# Patient Record
Sex: FEMALE | Race: WHITE | Employment: FULL TIME | URBAN - NONMETROPOLITAN AREA
[De-identification: names, ages, dates, MRNs, and addresses within clinical notes are randomized per-mention and may not be internally consistent; named-entity substitution may affect disease eponyms.]

---
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
## 2022-10-03 ENCOUNTER — HOSPITAL ENCOUNTER (OUTPATIENT)
Dept: LAB | Age: 22
Discharge: HOME | End: 2022-10-03
Payer: MEDICAID

## 2022-10-03 ENCOUNTER — OFFICE VISIT (OUTPATIENT)
Dept: FAMILY MEDICINE CLINIC | Age: 22
End: 2022-10-03
Payer: MEDICAID

## 2022-10-03 VITALS
WEIGHT: 234.8 LBS | TEMPERATURE: 97.3 F | BODY MASS INDEX: 40.08 KG/M2 | HEIGHT: 64 IN | RESPIRATION RATE: 20 BRPM | DIASTOLIC BLOOD PRESSURE: 86 MMHG | HEART RATE: 88 BPM | OXYGEN SATURATION: 100 % | SYSTOLIC BLOOD PRESSURE: 133 MMHG

## 2022-10-03 DIAGNOSIS — F33.1 MODERATE EPISODE OF RECURRENT MAJOR DEPRESSIVE DISORDER (HCC): ICD-10-CM

## 2022-10-03 DIAGNOSIS — R06.83 SNORING: Primary | ICD-10-CM

## 2022-10-03 DIAGNOSIS — F17.200 VAPING NICOTINE DEPENDENCE, NON-TOBACCO PRODUCT: ICD-10-CM

## 2022-10-03 DIAGNOSIS — Z00.00 HEALTHCARE MAINTENANCE: ICD-10-CM

## 2022-10-03 DIAGNOSIS — E66.01 MORBID OBESITY WITH BMI OF 40.0-44.9, ADULT (HCC): ICD-10-CM

## 2022-10-03 PROBLEM — F41.9 ANXIETY: Status: ACTIVE | Noted: 2022-03-24

## 2022-10-03 PROBLEM — J35.8 TONSIL STONE: Status: RESOLVED | Noted: 2018-12-19 | Resolved: 2022-10-03

## 2022-10-03 PROBLEM — N91.1 AMENORRHEA, SECONDARY: Status: RESOLVED | Noted: 2021-12-30 | Resolved: 2022-10-03

## 2022-10-03 PROBLEM — F41.0 GENERALIZED ANXIETY DISORDER WITH PANIC ATTACKS: Status: ACTIVE | Noted: 2021-08-03

## 2022-10-03 PROBLEM — F41.1 GENERALIZED ANXIETY DISORDER WITH PANIC ATTACKS: Status: ACTIVE | Noted: 2021-08-03

## 2022-10-03 PROBLEM — E66.9 OBESITY (BMI 35.0-39.9 WITHOUT COMORBIDITY): Status: ACTIVE | Noted: 2021-12-02

## 2022-10-03 PROBLEM — J35.01 CHRONIC TONSILLITIS: Status: ACTIVE | Noted: 2018-12-19

## 2022-10-03 PROBLEM — N91.1 AMENORRHEA, SECONDARY: Status: ACTIVE | Noted: 2021-12-30

## 2022-10-03 PROBLEM — N92.6 IRREGULAR MENSTRUAL CYCLE: Status: ACTIVE | Noted: 2022-07-11

## 2022-10-03 PROBLEM — J35.01 CHRONIC TONSILLITIS: Status: RESOLVED | Noted: 2018-12-19 | Resolved: 2022-10-03

## 2022-10-03 PROBLEM — J35.8 TONSIL STONE: Status: ACTIVE | Noted: 2018-12-19

## 2022-10-03 LAB
ALBUMIN SERPL-MCNC: 3.5 G/DL (ref 3.4–5)
ALBUMIN/GLOB SERPL: 0.8 {RATIO} (ref 0.8–1.7)
ALP SERPL-CCNC: 101 U/L (ref 45–117)
ALT SERPL-CCNC: 53 U/L (ref 13–56)
ANION GAP SERPL CALC-SCNC: 11 MMOL/L (ref 3–18)
APPEARANCE UR: CLEAR
AST SERPL W P-5'-P-CCNC: 30 U/L (ref 10–38)
BACTERIA URNS QL MICRO: ABNORMAL /HPF
BILIRUB SERPL-MCNC: 0.2 MG/DL (ref 0.2–1)
BILIRUB UR QL: NEGATIVE
BUN SERPL-MCNC: 3 MG/DL (ref 7–18)
BUN/CREAT SERPL: 5 (ref 12–20)
CA-I BLD-MCNC: 9.3 MG/DL (ref 8.5–10.1)
CHLORIDE SERPL-SCNC: 102 MMOL/L (ref 100–111)
CHOLEST SERPL-MCNC: 184 MG/DL
CO2 SERPL-SCNC: 28 MMOL/L (ref 21–32)
COLOR UR: YELLOW
CREAT SERPL-MCNC: 0.61 MG/DL (ref 0.6–1.3)
EPITH CASTS URNS QL MICRO: ABNORMAL /LPF (ref 0–20)
EST. AVERAGE GLUCOSE BLD GHB EST-MCNC: 111 MG/DL
GLOBULIN SER CALC-MCNC: 4.3 G/DL (ref 2–4)
GLUCOSE SERPL-MCNC: 102 MG/DL (ref 74–99)
GLUCOSE UR STRIP.AUTO-MCNC: NEGATIVE MG/DL
HBA1C MFR BLD: 5.5 % (ref 4.2–5.6)
HDLC SERPL-MCNC: 32 MG/DL (ref 40–60)
HDLC SERPL: 5.8 {RATIO} (ref 0–5)
HGB UR QL STRIP: NEGATIVE
KETONES UR QL STRIP.AUTO: NEGATIVE MG/DL
LDLC SERPL CALC-MCNC: ABNORMAL MG/DL (ref 0–100)
LEUKOCYTE ESTERASE UR QL STRIP.AUTO: ABNORMAL
LIPID PROFILE,FLP: ABNORMAL
NITRITE UR QL STRIP.AUTO: NEGATIVE
PH UR STRIP: 6.5 [PH] (ref 5–8)
POTASSIUM SERPL-SCNC: 3.5 MMOL/L (ref 3.5–5.5)
PROT SERPL-MCNC: 7.8 G/DL (ref 6.4–8.2)
PROT UR STRIP-MCNC: NEGATIVE MG/DL
RBC #/AREA URNS HPF: ABNORMAL /HPF (ref 0–2)
SODIUM SERPL-SCNC: 141 MMOL/L (ref 136–145)
SP GR UR REFRACTOMETRY: <1.005 (ref 1–1.03)
TRIGL SERPL-MCNC: 436 MG/DL (ref ?–150)
UROBILINOGEN UR QL STRIP.AUTO: 0.2 EU/DL (ref 0.2–1)
VLDLC SERPL CALC-MCNC: ABNORMAL MG/DL
WBC URNS QL MICRO: ABNORMAL /HPF (ref 0–4)

## 2022-10-03 PROCEDURE — 83036 HEMOGLOBIN GLYCOSYLATED A1C: CPT

## 2022-10-03 PROCEDURE — 84443 ASSAY THYROID STIM HORMONE: CPT

## 2022-10-03 PROCEDURE — 86803 HEPATITIS C AB TEST: CPT

## 2022-10-03 PROCEDURE — 80061 LIPID PANEL: CPT

## 2022-10-03 PROCEDURE — 36415 COLL VENOUS BLD VENIPUNCTURE: CPT

## 2022-10-03 PROCEDURE — 80053 COMPREHEN METABOLIC PANEL: CPT

## 2022-10-03 PROCEDURE — 81001 URINALYSIS AUTO W/SCOPE: CPT

## 2022-10-03 PROCEDURE — 99204 OFFICE O/P NEW MOD 45 MIN: CPT | Performed by: STUDENT IN AN ORGANIZED HEALTH CARE EDUCATION/TRAINING PROGRAM

## 2022-10-03 RX ORDER — ESCITALOPRAM OXALATE 10 MG/1
10 TABLET ORAL DAILY
Qty: 30 TABLET | Refills: 2 | Status: SHIPPED | OUTPATIENT
Start: 2022-10-03

## 2022-10-03 NOTE — PROGRESS NOTES
NEW PATIENT    History of Present Illness    Chief Complaint   Patient presents with    New Patient     Here to establish care        mEily Ba is a 23 y.o. female who presents today for establishment of care. Histories reviewed in detail and are outlined below. Patient has a history of anxiety and depression. She had been on Lexapro in the past and this had worked. She recently endorses some twitching in the arms and face, and stopped all of her medications abruptly about 2 weeks ago. Her PHQ 9 score is 18. She has no suicidal ideation. She takes hydroxyzine as needed for panic attacks. Patient endorses many other symptoms, has not been evaluated by a physician in a while. She endorses polyuria, polydipsia, frequent headaches, abdominal pain, twitching of the arms and head, poor hearing. She has family history of diabetes. She has gained about 100 pounds in the past 1 and half years without changing her diet or activity level. Past Medical History  Past Medical History:   Diagnosis Date    Chronic tonsillitis 12/19/2018    Depression     Tonsil stone 12/19/2018        Surgical History  Past Surgical History:   Procedure Laterality Date    HX TONSILLECTOMY          Current Medications  Current Outpatient Medications   Medication Sig    escitalopram oxalate (LEXAPRO) 10 mg tablet Take 1 Tablet by mouth daily. No current facility-administered medications for this visit.        Allergies/Drug Reactions  No Known Allergies     Family History  Family History   Problem Relation Age of Onset    Diabetes Father     Diabetes Maternal Grandmother         Social History  Social History     Socioeconomic History    Marital status: SINGLE   Tobacco Use    Smoking status: Every Day     Types: Cigarettes    Smokeless tobacco: Never   Vaping Use    Vaping Use: Every day   Substance and Sexual Activity    Alcohol use: Yes     Comment: occassionally    Drug use: Yes     Types: Marijuana     Social Determinants of Health     Financial Resource Strain: Low Risk     Difficulty of Paying Living Expenses: Not very hard   Food Insecurity: No Food Insecurity    Worried About Running Out of Food in the Last Year: Never true    Ran Out of Food in the Last Year: Never true   Transportation Needs: No Transportation Needs    Lack of Transportation (Medical): No    Lack of Transportation (Non-Medical): No   Housing Stability: Low Risk     Unable to Pay for Housing in the Last Year: No    Number of [place] in the Last Year: 1    Unstable Housing in the Last Year: No        OB History    Para Term  AB Living   0 0 0 0 0 0   SAB IAB Ectopic Molar Multiple Live Births   0 0 0 0 0 0       Health Maintenance   Topic Date Due    Hepatitis C Screening  Never done    Flu Vaccine (1) Never done    COVID-19 Vaccine (1) 2023 (Originally 2000)    Depression Monitoring  10/03/2023    DTaP/Tdap/Td series (7 - Td or Tdap) 10/02/2029    Pneumococcal 0-64 years (2 - PPSV23 or PCV20) 2067    HPV Age 9Y-26Y  Completed    Hepatitis A Peds Age 1-18  Completed     Immunization History   Administered Date(s) Administered    Hep A-Hep B, Pediatric/Adolescent 10/02/2019       Patient Care Team:  Benny Davis MD as PCP - General (Family Medicine)    Review of Systems  CONSTITUTIONAL: Denies weight loss, fevers and chills  HEENT: Denies changes in vision and hearing  RESP: Denies SOB and cough  CV: Denies palpitations, chest pain  GI: + abdominal pain, nausea, vomiting  : Denies dysuria and urinary frequency. + polyuria  MSK: Denies myalgia and joint pain  SKIN: Denies rash and pruritus  NEURO: + headaches  PSYCH: + recent changes in mood. + anxiety and depression.      Physical Exam  Vital signs:   Vitals:    10/03/22 1040   BP: 133/86   Pulse: 88   Resp: 20   Temp: 97.3 °F (36.3 °C)   TempSrc: Temporal   SpO2: 100%   Weight: 234 lb 12.8 oz (106.5 kg)   Height: 5' 4\" (1.626 m)       General: alert, oriented, not in distress  Head: scalp normal, atraumatic  Eyes: pupils are equal and reactive, full and intact EOM's  Ears: patent ear canal, intact tympanic membrane  Nose: normal turbinates, no congestion or discharge  Lips/Mouth: moist lips and buccal mucosa, non-enlarged tonsils, pink throat  Neck: supple,  no lymphadenopathy, normal thyroid  Chest/Lungs: clear breath sounds, no wheezing or crackles  Heart: normal rate, regular rhythm, no murmur  Abdomen: soft, non-distended, non-tender, normal bowel sounds, no organomegaly, no masses  Extremities: no focal deformities, no edema  Skin: no active skin lesions    Assessment/Plan:    1. Snoring  - SLEEP STUDY, ATTENDED    2. Moderate episode of recurrent major depressive disorder (HCC)  PHQ-9 of 18 without suicidal ideation. She has been on Lexapro in the past and it has helped. We will restart Lexapro. Follow-up in 4 to 6 weeks, may increase dose at that time if necessary. I also recommend counseling. I did not give patient the list today, will give to her at follow-up. - escitalopram oxalate (LEXAPRO) 10 mg tablet; Take 1 Tablet by mouth daily. Dispense: 30 Tablet; Refill: 2  - SLEEP STUDY, ATTENDED    3. Healthcare maintenance  - HEPATITIS C AB    4. Morbid obesity with BMI of 40.0-44.9, adult [facility])  Patient states she has gained over 100 pounds in less than 2 years without any changes in activity level or eating patterns. Has several symptoms currently. We will check labs per below. Diet and exercise counseling. Recommend at least 150 minutes weekly of moderate intensity activity. Recommend mediterranean diet. - METABOLIC PANEL, COMPREHENSIVE  - THYROID CASCADE PROFILE  - LIPID PANEL  - HEMOGLOBIN A1C WITH EAG  - URINALYSIS W/MICROSCOPIC     5. Vaping  We discussed risks of vaping, effect of nicotine on anxiety. I do recommend she cut down and eventually quit the vaping. Follow-up and Dispositions    Return in about 2 weeks (around 10/17/2022).            I have discussed the diagnosis with the patient and the intended plan as seen in the above orders. The patient has received an after-visit summary and questions were answered concerning future plans. I have discussed medication side effects and warnings with the patient as well. I have reviewed the plan of care with the patient, accepted their input and they are in agreement with the treatment goals. Previous lab and imaging results were reviewed by me. On this date 10/03/22 I have spent 25 minutes reviewing previous notes, test results and face to face with the patient for interview/exam, discussing working diagnosis and treatment plan as well as documenting on the day of the visit.        Jseus Banks MD  October 3, 2022

## 2022-10-03 NOTE — PROGRESS NOTES
Radha Antoine presents today for   Chief Complaint   Patient presents with    New Patient     Here to establish care        Is someone accompanying this pt? yes    Is the patient using any DME equipment during [address]? no    Depression Screening:  3 most recent PHQ Screens 10/3/2022   Little interest or pleasure in doing things More than half the days   Feeling down, depressed, irritable, or hopeless Nearly every day   Total Score PHQ 2 5   Trouble falling or staying asleep, or sleeping too much Nearly every day   Feeling tired or having little energy More than half the days   Poor appetite, weight loss, or overeating Nearly every day   Feeling bad about yourself - or that you are a failure or have let yourself or your family down Nearly every day   Trouble concentrating on things such as school, work, reading, or watching TV More than half the days   Moving or speaking so slowly that other people could have noticed; or the opposite being so fidgety that others notice Not at all   Thoughts of being better off dead, or hurting yourself in some way Not at all   PHQ 9 Score 18   How difficult have these problems made it for you to do your work, take care of your home and get along with others Very difficult       Learning Assessment:  No flowsheet data found. Health Maintenance reviewed and discussed and ordered per Provider. Health Maintenance Due   Topic Date Due    Hepatitis C Screening  Never done    Depression Monitoring  Never done    Flu Vaccine (1) Never done   . Coordination of Care:  1. \"Have you been to the ER, urgent care clinic since your last visit? Hospitalized since your last visit? \" Yes Where: [facility]     2. \"Have you seen or consulted any other health care providers outside of the [address] [place] [address] since your last visit? \" Yes Where: [facility]      3. For patients aged 39-70: Has the patient had a colonoscopy? NA - based on age     If the patient is female:    4.  For patients aged 40-74: Has the patient had a mammogram within the past 2 years? NA - based on age    11. For patients aged 21-65: Has the patient had a pap smear?  NA - based on age

## 2022-10-04 LAB
HCV AB SER IA-ACNC: 0.06 INDEX
HCV AB SERPL QL IA: NEGATIVE
HCV COMMENT,HCGAC: NORMAL
TSH SERPL-ACNC: 1.28 UIU/ML (ref 0.45–4.5)

## 2022-10-24 ENCOUNTER — OFFICE VISIT (OUTPATIENT)
Dept: FAMILY MEDICINE CLINIC | Age: 22
End: 2022-10-24
Payer: MEDICAID

## 2022-10-24 VITALS
OXYGEN SATURATION: 99 % | RESPIRATION RATE: 18 BRPM | WEIGHT: 232 LBS | SYSTOLIC BLOOD PRESSURE: 122 MMHG | BODY MASS INDEX: 39.61 KG/M2 | TEMPERATURE: 97.7 F | DIASTOLIC BLOOD PRESSURE: 80 MMHG | HEIGHT: 64 IN | HEART RATE: 77 BPM

## 2022-10-24 DIAGNOSIS — E66.01 MORBID OBESITY WITH BMI OF 40.0-44.9, ADULT (HCC): ICD-10-CM

## 2022-10-24 DIAGNOSIS — F41.0 GENERALIZED ANXIETY DISORDER WITH PANIC ATTACKS: ICD-10-CM

## 2022-10-24 DIAGNOSIS — F33.1 MODERATE EPISODE OF RECURRENT MAJOR DEPRESSIVE DISORDER (HCC): Primary | ICD-10-CM

## 2022-10-24 DIAGNOSIS — F41.1 GENERALIZED ANXIETY DISORDER WITH PANIC ATTACKS: ICD-10-CM

## 2022-10-24 DIAGNOSIS — F41.9 ANXIETY: ICD-10-CM

## 2022-10-24 PROCEDURE — 99214 OFFICE O/P EST MOD 30 MIN: CPT | Performed by: STUDENT IN AN ORGANIZED HEALTH CARE EDUCATION/TRAINING PROGRAM

## 2022-10-24 NOTE — PROGRESS NOTES
Chronic Disease Follow up    Raven Leon (: 2000) is a 23 y.o. female here for evaluation of the following chief concerns(s):  Follow-up (3 week follow up )       ASSESSMENT/PLAN:  1. Moderate episode of recurrent major depressive disorder ([facility] [address]  2. Anxiety  3. Generalized anxiety disorder with panic attacks  Not much change since last visit. PHQ-9 is still 20. Patient is not taking her Lexapro, she tells me that she keeps forgetting. Alarm has not worked. We discussed incorporating it into her routine, such as always taking the medication when she is brushing her teeth, mom may help with reminding as well. I also provided patient with counseling resources and I strongly suggest that she does counseling as well as medication management. We will see back in 6 weeks for follow-up, may increase or change medication at this time depending on how she is doing. 4. Morbid obesity with BMI of 40.0-44.9, adult ([facility] [address]  Diet and exercise counseling in detail today. Recommend at least 150 minutes weekly of moderate intensity activity. Recommend mediterranean diet. We discussed recommendation of 3 small meals/day for metabolism. We will have to repeat her lipid panel fasting at some point, triglycerides were too high to calculate LDL. Patient was not fasting for this lab work. Return in about 6 weeks (around 2022). Raven Leon agrees with plan as above and has no additional questions at this time. SUBJECTIVE/OBJECTIVE:  Patient presents for follow-up anxiety/depression, follow-up labs. Patient endorses ongoing severe anxiety, as well as some depressive symptoms. She tells me that she keeps forgetting to take her Lexapro. She has tried setting an alarm, but this has not helped much.   She denies any side effects from the medication, and the Lexapro did help her with her anxiety/depression in the past.  Patient endorses poor sleep, stress from her job as she works at a [employer] and has to get up very early in the morning. She feels fatigue, as well as little motivation to do anything. She denies any suicidal ideation. Patient also has significant concerns with her weight. Lab work-up from last visit was overall normal, no diabetes, no thyroid abnormalities. Patient tells me she only eats 1 meal per day, usually does not feel hungry otherwise. She does like fruits, really does not like many vegetables other than carrots. She eats quite a bit of carbs. She is very picky eater and always has been per mom. She does not do any scheduled exercise, works at [employer] and does a lot of walking at work.       Past Medical History:   Diagnosis Date    Chronic tonsillitis 12/19/2018    Depression     Tonsil stone 12/19/2018     Past Surgical History:   Procedure Laterality Date    HX TONSILLECTOMY       Family History   Problem Relation Age of Onset    Diabetes Father     Diabetes Maternal Grandmother        Social History     Socioeconomic History    Marital status: SINGLE   Tobacco Use    Smoking status: Every Day     Types: Cigarettes    Smokeless tobacco: Never   Vaping Use    Vaping Use: Every day   Substance and Sexual Activity    Alcohol use: Yes     Comment: occassionally    Drug use: Yes     Types: Marijuana     Social Determinants of Health     Financial Resource Strain: Low Risk     Difficulty of Paying Living Expenses: Not very hard   Food Insecurity: No Food Insecurity    Worried About Running Out of Food in the Last Year: Never true    Ran Out of Food in the Last Year: Never true   Transportation Needs: No Transportation Needs    Lack of Transportation (Medical): No    Lack of Transportation (Non-Medical): No   Housing Stability: Low Risk     Unable to Pay for Housing in the Last Year: No    Number of Places Lived in the Last Year: 1    Unstable Housing in the Last Year: No     Social History     Tobacco Use   Smoking Status Every Day    Types: Cigarettes   Smokeless Tobacco Never Current Outpatient Medications   Medication Sig Dispense Refill    escitalopram oxalate (LEXAPRO) 10 mg tablet Take 1 Tablet by mouth daily. 30 Tablet 2     No Known Allergies    /80 (BP 1 Location: Right arm, BP Patient Position: Sitting, BP Cuff Size: Large adult)   Pulse 77   Temp 97.7 °F (36.5 °C) (Temporal)   Resp 18   Ht 5' 4\" (1.626 m)   Wt 232 lb (105.2 kg)   SpO2 99%   BMI 39.82 kg/m²     Gen: NAD, well appearing   Heart: RRR, no m/g/r  Lungs: CTA bilaterally, no wheezing, breathing comfortably  Abd: Soft, non tender to palpation  Ext: No swelling  Psych: cooperative. Appropriate mood and affect. No results found for: WBC, WBCLT, HGBPOC, HGB, HGBP, HCTPOC, HCT, PHCT, RBCH, PLT, MCV, HGBEXT, HCTEXT, PLTEXT  Lab Results   Component Value Date/Time    Sodium 141 10/03/2022 11:32 AM    Potassium 3.5 10/03/2022 11:32 AM    Chloride 102 10/03/2022 11:32 AM    CO2 28 10/03/2022 11:32 AM    Anion gap 11 10/03/2022 11:32 AM    Glucose 102 (H) 10/03/2022 11:32 AM    BUN 3 (L) 10/03/2022 11:32 AM    Creatinine 0.61 10/03/2022 11:32 AM    BUN/Creatinine ratio 5 (L) 10/03/2022 11:32 AM    GFR est AA >60 10/03/2022 11:32 AM    GFR est non-AA >60 10/03/2022 11:32 AM    Calcium 9.3 10/03/2022 11:32 AM    Bilirubin, total 0.2 10/03/2022 11:32 AM    Alk. phosphatase 101 10/03/2022 11:32 AM    Protein, total 7.8 10/03/2022 11:32 AM    Albumin 3.5 10/03/2022 11:32 AM    Globulin 4.3 (H) 10/03/2022 11:32 AM    A-G Ratio 0.8 10/03/2022 11:32 AM    ALT (SGPT) 53 10/03/2022 11:32 AM    AST (SGOT) 30 10/03/2022 11:32 AM     Lab Results   Component Value Date/Time    Cholesterol, total 184 10/03/2022 11:32 AM    HDL Cholesterol 32 (L) 10/03/2022 11:32 AM    LDL, calculated Reference range: 0 to 100 10/03/2022 11:32 AM    VLDL, calculated  10/03/2022 11:32 AM     Calculation not valid with this patients other Lipid values.     Triglyceride 436 (H) 10/03/2022 11:32 AM    CHOL/HDL Ratio 5.8 (H) 10/03/2022 11:32 AM On this date 10/24/22 I have spent 35 minutes reviewing previous notes, test results and face to face with the patient for interview/exam, discussing working diagnosis and treatment plan as well as documenting on the day of the visit. Medical decision making complexity: moderate-high    I have discussed the diagnosis with the patient and the intended plan as seen in the above orders. The patient has received an after-visit summary and questions were answered concerning future plans. I have discussed medication side effects and warnings with the patient as well. I have reviewed the plan of care with the patient, accepted their input and they are in agreement with the treatment goals. Previous lab and imaging results were reviewed by me.      Brittanie Martell MD   Family Medicine

## 2022-10-24 NOTE — PROGRESS NOTES
Bong Bird presents today for   Chief Complaint   Patient presents with    Follow-up     3 week follow up        Is someone accompanying this pt? no    Is the patient using any DME equipment during [address]? no    Depression Screening:  3 most recent PHQ Screens 10/24/2022   Little interest or pleasure in doing things Nearly every day   Feeling down, depressed, irritable, or hopeless Nearly every day   Total Score PHQ 2 6   Trouble falling or staying asleep, or sleeping too much Nearly every day   Feeling tired or having little energy Nearly every day   Poor appetite, weight loss, or overeating Nearly every day   Feeling bad about yourself - or that you are a failure or have let yourself or your family down Nearly every day   Trouble concentrating on things such as school, work, reading, or watching TV More than half the days   Moving or speaking so slowly that other people could have noticed; or the opposite being so fidgety that others notice Not at all   Thoughts of being better off dead, or hurting yourself in some way Not at all   PHQ 9 Score 20   How difficult have these problems made it for you to do your work, take care of your home and get along with others Somewhat difficult       Learning Assessment:  No flowsheet data found. Health Maintenance reviewed and discussed and ordered per Provider. Health Maintenance Due   Topic Date Due    Flu Vaccine (1) Never done   . Coordination of Care:  1. \"Have you been to the ER, urgent care clinic since your last visit? Hospitalized since your last visit? \" No    2. \"Have you seen or consulted any other health care providers outside of the [address] [place] [address] since your last visit? \" No     3. For patients aged 39-70: Has the patient had a colonoscopy? NA - based on age     If the patient is female:    4. For patients aged 41-77: Has the patient had a mammogram within the past 2 years? NA - based on age    11.  For patients aged 21-65: Has the patient had a pap smear?  NA - based on age

## 2022-12-05 ENCOUNTER — OFFICE VISIT (OUTPATIENT)
Dept: FAMILY MEDICINE CLINIC | Age: 22
End: 2022-12-05
Payer: MEDICAID

## 2022-12-05 VITALS
TEMPERATURE: 97.4 F | WEIGHT: 234.6 LBS | OXYGEN SATURATION: 100 % | SYSTOLIC BLOOD PRESSURE: 123 MMHG | BODY MASS INDEX: 40.05 KG/M2 | HEART RATE: 66 BPM | DIASTOLIC BLOOD PRESSURE: 86 MMHG | HEIGHT: 64 IN | RESPIRATION RATE: 18 BRPM

## 2022-12-05 DIAGNOSIS — F33.1 MODERATE EPISODE OF RECURRENT MAJOR DEPRESSIVE DISORDER (HCC): ICD-10-CM

## 2022-12-05 DIAGNOSIS — M54.50 ACUTE BILATERAL LOW BACK PAIN WITHOUT SCIATICA: Primary | ICD-10-CM

## 2022-12-05 PROCEDURE — 99214 OFFICE O/P EST MOD 30 MIN: CPT | Performed by: STUDENT IN AN ORGANIZED HEALTH CARE EDUCATION/TRAINING PROGRAM

## 2022-12-05 RX ORDER — ESCITALOPRAM OXALATE 20 MG/1
20 TABLET ORAL DAILY
Qty: 30 TABLET | Refills: 1 | Status: SHIPPED | OUTPATIENT
Start: 2022-12-05

## 2022-12-05 NOTE — PROGRESS NOTES
Chronic Disease Follow up    Mara Serrato (: 2000) is a 23 y.o. female here for evaluation of the following chief concerns(s):  Follow-up (6 week follow up)       ASSESSMENT/PLAN:  1. Moderate episode of recurrent major depressive disorder ([facility] [address]  2. Anxiety  3. Generalized anxiety disorder with panic attacks  4. Low back pain    - Depression/anxiety is still uncontrolled- increase Lexapro to 20mg daily. Discussed lifestyle management - mindfulness meditation, regular exercise, thankfulness journal. Could try [facility] for counseling services. FU 1 month   - Low back pain- MSK- tylenol/ibuprofen as needed. Stretching/strengthening exercises attached to AVS. Weight loss/core strengthening. Return in about 6 weeks (around 2023). Mara Serrato agrees with plan as above and has no additional questions at this time. SUBJECTIVE/OBJECTIVE:  Patient presents for follow-up anxiety/depression. Has been taking Lexapro compliantly for the past several weeks. Has not seen much difference. Still sleeping all the time, feeling anxious, not much motivation to do anything. Is stressed out right now living with her sister. Lexapro has worked int he past for her. Was unable to get in with counselor in [place] because of insurance issues. No SI/HI. Low back pain- started a couple of months ago. Achy pain across low back. No pain or weakness of legs. Worse when standing- works at a [employer] and stands all day. Wears tennis shoes. Ibuprofen helps some.        Past Medical History:   Diagnosis Date    Chronic tonsillitis 2018    Depression     Tonsil stone 2018     Past Surgical History:   Procedure Laterality Date    HX TONSILLECTOMY       Family History   Problem Relation Age of Onset    Diabetes Father     Diabetes Maternal Grandmother        Social History     Socioeconomic History    Marital status: SINGLE   Tobacco Use    Smoking status: Every Day     Types: Cigarettes    Smokeless tobacco: Never Vaping Use    Vaping Use: Every day   Substance and Sexual Activity    Alcohol use: Yes     Comment: occassionally    Drug use: Yes     Types: Marijuana     Social Determinants of Health     Financial Resource Strain: Low Risk     Difficulty of Paying Living Expenses: Not very hard   Food Insecurity: No Food Insecurity    Worried About Running Out of Food in the Last Year: Never true    Ran Out of Food in the Last Year: Never true   Transportation Needs: No Transportation Needs    Lack of Transportation (Medical): No    Lack of Transportation (Non-Medical): No   Housing Stability: Low Risk     Unable to Pay for Housing in the Last Year: No    Number of [place] in the Last Year: 1    Unstable Housing in the Last Year: No     Social History     Tobacco Use   Smoking Status Every Day    Types: Cigarettes   Smokeless Tobacco Never       Current Outpatient Medications   Medication Sig Dispense Refill    escitalopram oxalate (LEXAPRO) 20 mg tablet Take 1 Tablet by mouth daily. 30 Tablet 1     No Known Allergies    /86 (BP 1 Location: Right arm, BP Patient Position: Sitting, BP Cuff Size: Large adult)   Pulse 66   Temp 97.4 °F (36.3 °C) (Temporal)   Resp 18   Ht 5' 4\" (1.626 m)   Wt 234 lb 9.6 oz (106.4 kg)   SpO2 100%   BMI 40.27 kg/m²     Gen: NAD, well appearing   Back: pain with palpation on lumbar paraspinals bilaterally. No spinal tenderness. Good leg strength. No obvious deformity. Ext: No swelling  Psych: cooperative. Appropriate mood and affect.     No results found for: WBC, WBCLT, HGBPOC, HGB, HGBP, HCTPOC, HCT, PHCT, RBCH, PLT, MCV, HGBEXT, HCTEXT, PLTEXT, HGBEXT, HCTEXT, PLTEXT  Lab Results   Component Value Date/Time    Sodium 141 10/03/2022 11:32 AM    Potassium 3.5 10/03/2022 11:32 AM    Chloride 102 10/03/2022 11:32 AM    CO2 28 10/03/2022 11:32 AM    Anion gap 11 10/03/2022 11:32 AM    Glucose 102 (H) 10/03/2022 11:32 AM    BUN 3 (L) 10/03/2022 11:32 AM    Creatinine 0.61 10/03/2022 11:32 AM    BUN/Creatinine ratio 5 (L) 10/03/2022 11:32 AM    GFR est AA >60 10/03/2022 11:32 AM    GFR est non-AA >60 10/03/2022 11:32 AM    Calcium 9.3 10/03/2022 11:32 AM    Bilirubin, total 0.2 10/03/2022 11:32 AM    Alk. phosphatase 101 10/03/2022 11:32 AM    Protein, total 7.8 10/03/2022 11:32 AM    Albumin 3.5 10/03/2022 11:32 AM    Globulin 4.3 (H) 10/03/2022 11:32 AM    A-G Ratio 0.8 10/03/2022 11:32 AM    ALT (SGPT) 53 10/03/2022 11:32 AM    AST (SGOT) 30 10/03/2022 11:32 AM     Lab Results   Component Value Date/Time    Cholesterol, total 184 10/03/2022 11:32 AM    HDL Cholesterol 32 (L) 10/03/2022 11:32 AM    LDL, calculated Reference range: 0 to 100 10/03/2022 11:32 AM    VLDL, calculated  10/03/2022 11:32 AM     Calculation not valid with this patients other Lipid values. Triglyceride 436 (H) 10/03/2022 11:32 AM    CHOL/HDL Ratio 5.8 (H) 10/03/2022 11:32 AM       On this date 12/05/22 I have spent 35 minutes reviewing previous notes, test results and face to face with the patient for interview/exam, discussing working diagnosis and treatment plan as well as documenting on the day of the visit. Medical decision making complexity: moderate-high    I have discussed the diagnosis with the patient and the intended plan as seen in the above orders. The patient has received an after-visit summary and questions were answered concerning future plans. I have discussed medication side effects and warnings with the patient as well. I have reviewed the plan of care with the patient, accepted their input and they are in agreement with the treatment goals. Previous lab and imaging results were reviewed by me.      Najma Olson MD   Family Medicine

## 2022-12-05 NOTE — PROGRESS NOTES
Doretha Butterfield presents today for   Chief Complaint   Patient presents with    Follow-up     6 week follow up       Is someone accompanying this pt? yes    Is the patient using any DME equipment during [address]? no    Depression Screening:  3 most recent PHQ Screens 12/5/2022   Little interest or pleasure in doing things Nearly every day   Feeling down, depressed, irritable, or hopeless Nearly every day   Total Score PHQ 2 6   Trouble falling or staying asleep, or sleeping too much Nearly every day   Feeling tired or having little energy Nearly every day   Poor appetite, weight loss, or overeating More than half the days   Feeling bad about yourself - or that you are a failure or have let yourself or your family down Nearly every day   Trouble concentrating on things such as school, work, reading, or watching TV More than half the days   Moving or speaking so slowly that other people could have noticed; or the opposite being so fidgety that others notice More than half the days   Thoughts of being better off dead, or hurting yourself in some way Not at all   PHQ 9 Score 21   How difficult have these problems made it for you to do your work, take care of your home and get along with others Very difficult       Learning Assessment:  No flowsheet data found. Health Maintenance reviewed and discussed and ordered per Provider. Health Maintenance Due   Topic Date Due    Flu Vaccine (1) Never done   . Coordination of Care:  1. \"Have you been to the ER, urgent care clinic since your last visit? Hospitalized since your last visit? \" No    2. \"Have you seen or consulted any other health care providers outside of the [address] [place] [address] since your last visit? \" No     3. For patients aged 39-70: Has the patient had a colonoscopy? NA - based on age     If the patient is female:    4. For patients aged 41-77: Has the patient had a mammogram within the past 2 years? NA - based on age    11. For patients aged 21-65:  Has the patient had a pap smear?  NA - based on age

## 2023-01-16 ENCOUNTER — OFFICE VISIT (OUTPATIENT)
Dept: FAMILY MEDICINE CLINIC | Age: 23
End: 2023-01-16
Payer: MEDICAID

## 2023-01-16 VITALS
RESPIRATION RATE: 20 BRPM | OXYGEN SATURATION: 100 % | WEIGHT: 233.4 LBS | BODY MASS INDEX: 39.85 KG/M2 | HEART RATE: 81 BPM | SYSTOLIC BLOOD PRESSURE: 107 MMHG | TEMPERATURE: 97.3 F | DIASTOLIC BLOOD PRESSURE: 67 MMHG | HEIGHT: 64 IN

## 2023-01-16 DIAGNOSIS — Z32.02 PREGNANCY EXAMINATION OR TEST, NEGATIVE RESULT: Primary | ICD-10-CM

## 2023-01-16 LAB
HCG URINE, QL. (POC): NEGATIVE
VALID INTERNAL CONTROL?: YES

## 2023-01-16 PROCEDURE — 99214 OFFICE O/P EST MOD 30 MIN: CPT | Performed by: STUDENT IN AN ORGANIZED HEALTH CARE EDUCATION/TRAINING PROGRAM

## 2023-01-16 PROCEDURE — 81025 URINE PREGNANCY TEST: CPT | Performed by: STUDENT IN AN ORGANIZED HEALTH CARE EDUCATION/TRAINING PROGRAM

## 2023-01-16 RX ORDER — SERTRALINE HYDROCHLORIDE 50 MG/1
50 TABLET, FILM COATED ORAL DAILY
Qty: 30 TABLET | Refills: 1 | Status: SHIPPED | OUTPATIENT
Start: 2023-01-16

## 2023-01-16 NOTE — PROGRESS NOTES
Patrick Crockett presents today for   Chief Complaint   Patient presents with    Follow-up     6w follow up        Is someone accompanying this pt? yes    Is the patient using any DME equipment during [address]? no    Depression Screening:  3 most recent PHQ Screens 1/16/2023   Little interest or pleasure in doing things More than half the days   Feeling down, depressed, irritable, or hopeless More than half the days   Total Score PHQ 2 4   Trouble falling or staying asleep, or sleeping too much More than half the days   Feeling tired or having little energy Nearly every day   Poor appetite, weight loss, or overeating Nearly every day   Feeling bad about yourself - or that you are a failure or have let yourself or your family down More than half the days   Trouble concentrating on things such as school, work, reading, or watching TV More than half the days   Moving or speaking so slowly that other people could have noticed; or the opposite being so fidgety that others notice More than half the days   Thoughts of being better off dead, or hurting yourself in some way Not at all   PHQ 9 Score 18   How difficult have these problems made it for you to do your work, take care of your home and get along with others Somewhat difficult       Learning Assessment:  No flowsheet data found. Health Maintenance reviewed and discussed and ordered per Provider. Health Maintenance Due   Topic Date Due    Flu Vaccine (1) Never done   . Coordination of Care:  1. \"Have you been to the ER, urgent care clinic since your last visit? Hospitalized since your last visit? \" No    2. \"Have you seen or consulted any other health care providers outside of the [address] [place] [address] since your last visit? \" No     3. For patients aged 39-70: Has the patient had a colonoscopy? NA - based on age     If the patient is female:    4. For patients aged 41-77: Has the patient had a mammogram within the past 2 years? NA - based on age    11.  For patients aged 21-65: Has the patient had a pap smear?  NA - based on age

## 2023-01-16 NOTE — PROGRESS NOTES
Chronic Disease Follow up    Rojas Jimenez (: 2000) is a 23 y.o. female here for evaluation of the following chief concerns(s):  Follow-up (6w follow up )       ASSESSMENT/PLAN:  1. Moderate episode of recurrent major depressive disorder ([address]  2. Anxiety  3. Generalized anxiety disorder with panic attacks  4. Amenorrhea    - Depression/anxiety is still uncontrolled- Taper Lexapro, start Zoloft. Continue lifestyle management - mindfulness meditation, regular exercise, thankfulness journal- counseling is not affordable. Continue Hydroxyzine as needed for panic attacks. FU 1 month   - amenorrhea- suspect PCOS. Upreg negative. FU with GYN- patient does not want to take hormones to regulate her period. Return in about 1 month (around 2023). Rojas Jimenez agrees with plan as above and has no additional questions at this time. SUBJECTIVE/OBJECTIVE:  Patient presents for follow-up anxiety/depression. Has been taking Lexapro compliantly for the past several weeks. Has not seen much difference. She has had lots of panic attacks over the past couple of weeks. Lexapro has worked int he past for her. Was unable to get in with counselor in [place] because of insurance issues. No SI/HI. Patient has not had a period since August.  She has had irregular periods for years. At one time her GYN gave her a medication to induce periods. She does not want to start birth control or any sort of hormones to regulate her period.       Past Medical History:   Diagnosis Date    Chronic tonsillitis 2018    Depression     Tonsil stone 2018     Past Surgical History:   Procedure Laterality Date    HX TONSILLECTOMY       Family History   Problem Relation Age of Onset    Diabetes Father     Diabetes Maternal Grandmother        Social History     Socioeconomic History    Marital status: SINGLE   Tobacco Use    Smoking status: Every Day     Types: Cigarettes    Smokeless tobacco: Never   Vaping Use Vaping Use: Every day   Substance and Sexual Activity    Alcohol use: Yes     Comment: occassionally    Drug use: Yes     Types: Marijuana     Social Determinants of Health     Financial Resource Strain: Low Risk     Difficulty of Paying Living Expenses: Not very hard   Food Insecurity: No Food Insecurity    Worried About Running Out of Food in the Last Year: Never true    Ran Out of Food in the Last Year: Never true   Transportation Needs: No Transportation Needs    Lack of Transportation (Medical): No    Lack of Transportation (Non-Medical): No   Housing Stability: Low Risk     Unable to Pay for Housing in the Last Year: No    Number of [place] in the Last Year: 1    Unstable Housing in the Last Year: No     Social History     Tobacco Use   Smoking Status Every Day    Types: Cigarettes   Smokeless Tobacco Never       Current Outpatient Medications   Medication Sig Dispense Refill    sertraline (ZOLOFT) 50 mg tablet Take 1 Tablet by mouth daily. 30 Tablet 1     No Known Allergies    /67 (BP 1 Location: Right arm, BP Patient Position: Sitting, BP Cuff Size: Large adult)   Pulse 81   Temp 97.3 °F (36.3 °C) (Temporal)   Resp 20   Ht 5' 4\" (1.626 m)   Wt 233 lb 6.4 oz (105.9 kg)   SpO2 100%   BMI 40.06 kg/m²     Gen: NAD, well appearing   Back: pain with palpation on lumbar paraspinals bilaterally. No spinal tenderness. Good leg strength. No obvious deformity. Ext: No swelling  Psych: cooperative. Appropriate mood and affect.     No results found for: WBC, WBCLT, HGBPOC, HGB, HGBP, HCTPOC, HCT, PHCT, RBCH, PLT, MCV, HGBEXT, HCTEXT, PLTEXT, HGBEXT, HCTEXT, PLTEXT  Lab Results   Component Value Date/Time    Sodium 141 10/03/2022 11:32 AM    Potassium 3.5 10/03/2022 11:32 AM    Chloride 102 10/03/2022 11:32 AM    CO2 28 10/03/2022 11:32 AM    Anion gap 11 10/03/2022 11:32 AM    Glucose 102 (H) 10/03/2022 11:32 AM    BUN 3 (L) 10/03/2022 11:32 AM    Creatinine 0.61 10/03/2022 11:32 AM    BUN/Creatinine ratio 5 (L) 10/03/2022 11:32 AM    GFR est AA >60 10/03/2022 11:32 AM    GFR est non-AA >60 10/03/2022 11:32 AM    Calcium 9.3 10/03/2022 11:32 AM    Bilirubin, total 0.2 10/03/2022 11:32 AM    Alk. phosphatase 101 10/03/2022 11:32 AM    Protein, total 7.8 10/03/2022 11:32 AM    Albumin 3.5 10/03/2022 11:32 AM    Globulin 4.3 (H) 10/03/2022 11:32 AM    A-G Ratio 0.8 10/03/2022 11:32 AM    ALT (SGPT) 53 10/03/2022 11:32 AM    AST (SGOT) 30 10/03/2022 11:32 AM     Lab Results   Component Value Date/Time    Cholesterol, total 184 10/03/2022 11:32 AM    HDL Cholesterol 32 (L) 10/03/2022 11:32 AM    LDL, calculated Reference range: 0 to 100 10/03/2022 11:32 AM    VLDL, calculated  10/03/2022 11:32 AM     Calculation not valid with this patients other Lipid values. Triglyceride 436 (H) 10/03/2022 11:32 AM    CHOL/HDL Ratio 5.8 (H) 10/03/2022 11:32 AM       On this date 01/16/23 I have spent 25 minutes reviewing previous notes, test results and face to face with the patient for interview/exam, discussing working diagnosis and treatment plan as well as documenting on the day of the visit. Medical decision making complexity: moderate-high    I have discussed the diagnosis with the patient and the intended plan as seen in the above orders. The patient has received an after-visit summary and questions were answered concerning future plans. I have discussed medication side effects and warnings with the patient as well. I have reviewed the plan of care with the patient, accepted their input and they are in agreement with the treatment goals. Previous lab and imaging results were reviewed by me.      Olya Marshall MD   Family Medicine

## 2023-03-13 ENCOUNTER — OFFICE VISIT (OUTPATIENT)
Facility: CLINIC | Age: 23
End: 2023-03-13
Payer: MEDICAID

## 2023-03-13 VITALS
SYSTOLIC BLOOD PRESSURE: 102 MMHG | OXYGEN SATURATION: 100 % | HEART RATE: 60 BPM | TEMPERATURE: 97.3 F | WEIGHT: 220 LBS | DIASTOLIC BLOOD PRESSURE: 70 MMHG | BODY MASS INDEX: 37.56 KG/M2 | RESPIRATION RATE: 19 BRPM | HEIGHT: 64 IN

## 2023-03-13 DIAGNOSIS — F41.3 OTHER MIXED ANXIETY DISORDERS: ICD-10-CM

## 2023-03-13 DIAGNOSIS — F33.1 MAJOR DEPRESSIVE DISORDER, RECURRENT, MODERATE (HCC): Primary | ICD-10-CM

## 2023-03-13 DIAGNOSIS — E66.09 CLASS 2 OBESITY DUE TO EXCESS CALORIES WITHOUT SERIOUS COMORBIDITY WITH BODY MASS INDEX (BMI) OF 37.0 TO 37.9 IN ADULT: ICD-10-CM

## 2023-03-13 PROBLEM — J35.01 CHRONIC TONSILLITIS: Status: ACTIVE | Noted: 2018-12-19

## 2023-03-13 PROBLEM — J35.8 TONSIL STONE: Status: ACTIVE | Noted: 2018-12-19

## 2023-03-13 PROBLEM — E66.01 MORBID OBESITY WITH BMI OF 40.0-44.9, ADULT (HCC): Status: RESOLVED | Noted: 2021-12-02 | Resolved: 2023-03-13

## 2023-03-13 PROBLEM — J35.8 TONSIL STONE: Status: RESOLVED | Noted: 2018-12-19 | Resolved: 2023-03-13

## 2023-03-13 PROBLEM — E66.9 OBESITY (BMI 35.0-39.9 WITHOUT COMORBIDITY): Status: ACTIVE | Noted: 2021-12-02

## 2023-03-13 PROBLEM — N92.6 IRREGULAR MENSTRUAL CYCLE: Status: ACTIVE | Noted: 2022-07-11

## 2023-03-13 PROBLEM — N91.1 AMENORRHEA, SECONDARY: Status: ACTIVE | Noted: 2021-12-30

## 2023-03-13 PROBLEM — E66.812 CLASS 2 OBESITY DUE TO EXCESS CALORIES WITHOUT SERIOUS COMORBIDITY WITH BODY MASS INDEX (BMI) OF 37.0 TO 37.9 IN ADULT: Status: ACTIVE | Noted: 2023-03-13

## 2023-03-13 PROCEDURE — 99214 OFFICE O/P EST MOD 30 MIN: CPT | Performed by: STUDENT IN AN ORGANIZED HEALTH CARE EDUCATION/TRAINING PROGRAM

## 2023-03-13 RX ORDER — BUSPIRONE HYDROCHLORIDE 5 MG/1
5 TABLET ORAL 2 TIMES DAILY
Qty: 60 TABLET | Refills: 1 | Status: SHIPPED | OUTPATIENT
Start: 2023-03-13 | End: 2023-04-12

## 2023-03-13 ASSESSMENT — PATIENT HEALTH QUESTIONNAIRE - PHQ9
SUM OF ALL RESPONSES TO PHQ QUESTIONS 1-9: 8
9. THOUGHTS THAT YOU WOULD BE BETTER OFF DEAD, OR OF HURTING YOURSELF: 0
SUM OF ALL RESPONSES TO PHQ QUESTIONS 1-9: 8
SUM OF ALL RESPONSES TO PHQ QUESTIONS 1-9: 8
2. FEELING DOWN, DEPRESSED OR HOPELESS: 1
4. FEELING TIRED OR HAVING LITTLE ENERGY: 2
SUM OF ALL RESPONSES TO PHQ9 QUESTIONS 1 & 2: 2
7. TROUBLE CONCENTRATING ON THINGS, SUCH AS READING THE NEWSPAPER OR WATCHING TELEVISION: 1
SUM OF ALL RESPONSES TO PHQ QUESTIONS 1-9: 8
8. MOVING OR SPEAKING SO SLOWLY THAT OTHER PEOPLE COULD HAVE NOTICED. OR THE OPPOSITE, BEING SO FIGETY OR RESTLESS THAT YOU HAVE BEEN MOVING AROUND A LOT MORE THAN USUAL: 1
10. IF YOU CHECKED OFF ANY PROBLEMS, HOW DIFFICULT HAVE THESE PROBLEMS MADE IT FOR YOU TO DO YOUR WORK, TAKE CARE OF THINGS AT HOME, OR GET ALONG WITH OTHER PEOPLE: 3
1. LITTLE INTEREST OR PLEASURE IN DOING THINGS: 1
3. TROUBLE FALLING OR STAYING ASLEEP: 0
6. FEELING BAD ABOUT YOURSELF - OR THAT YOU ARE A FAILURE OR HAVE LET YOURSELF OR YOUR FAMILY DOWN: 0
5. POOR APPETITE OR OVEREATING: 2

## 2023-03-13 NOTE — PROGRESS NOTES
Chronic Disease Follow up    Parke Essex (: 2000) is a 26 y.o. female here for evaluation of the following chief concerns(s):  Follow-up (6 week follow up )       ASSESSMENT/PLAN:  1. Major depressive disorder, recurrent, moderate (HCC)  2. Other mixed anxiety disorders  -     busPIRone (BUSPAR) 5 MG tablet; Take 1 tablet by mouth 2 times daily, Disp-60 tablet, R-1Normal  3. Class 2 obesity due to excess calories without serious comorbidity with body mass index (BMI) of 37.0 to 37.9 in adult  No orders of the defined types were placed in this encounter. Depression improved- continue Zoloft   Anxiety uncontrolled- we discussed option of increasing Zoloft vs adding Buspar- patient has chosen to add Buspar- discussed side effect profile   Discussed another non medication lifestyle change today - patient's goal for next visit is to read the bible every day. She is exercising daily which was her goal last time and has lost >15 lbs - keep up the great work. Return in about 4 weeks (around 4/10/2023). Patient agrees with plan as above and has no additional questions at this time. SUBJECTIVE/OBJECTIVE:    Patient presents for follow up chronic disease     Anxiety/depression- Zoloft is working. No major side effects. She is no longer feeling sad. She is exercising daily. Her anxiety is still bad- having panic attacks almost daily- takes Hydroxyzine PRN, but makes her sleepy. Unable to do therapy because of insurance issues. Sleep is good for the most par. Vitals:    23 1546   BP: 102/70   Site: Left Upper Arm   Position: Sitting   Cuff Size: Large Adult   Pulse: 60   Resp: 19   Temp: 97.3 °F (36.3 °C)   TempSrc: Temporal   SpO2: 100%   Weight: 220 lb (99.8 kg)   Height: 5' 4\" (1.626 m)      Body mass index is 37.76 kg/m². Gen: NAD, well appearing   Ext: No swelling  Psych: cooperative. Appropriate mood and affect. I reviewed prior available labs.      Medical decision making complexity: moderate-high    I have discussed the diagnosis with the patient and the intended plan as seen in the above orders. The patient has received an after-visit summary and questions were answered concerning future plans. I have discussed medication side effects and warnings with the patient as well. I have reviewed the plan of care with the patient, accepted their input and they are in agreement with the treatment goals. Previous lab and imaging results were reviewed by me.      Mick Kelley MD   Family Medicine

## 2023-03-13 NOTE — PROGRESS NOTES
Evgeny Santamaria presents today for   Chief Complaint   Patient presents with    Follow-up     6 week follow up        Is someone accompanying this pt? no    Is the patient using any DME equipment during OV? no    Health Maintenance reviewed and discussed and ordered per Provider. Health Maintenance Due   Topic Date Due    Pneumococcal 0-64 years Vaccine (1 - PCV) 02/23/2008    HIV screen  Never done    Chlamydia/GC screen  Never done    Varicella vaccine (2 of 2 - 13+ 2-dose series) 08/31/2021    Flu vaccine (1) Never done   . Coordination of Care:  1. \"Have you been to the ER, urgent care clinic since your last visit? Hospitalized since your last visit? \" No    2. \"Have you seen or consulted any other health care providers outside of the [address] [place] [address] since your last visit? \" No    3. For patients aged 39-70: Has the patient had a colonoscopy? N/A based on age/sex    If the patient is female:    4. For patients aged 41-77: Has the patient had a mammogram within the past 2 years? N/A based on age/sex    5. For patients aged 21-65: Has the patient had a pap smear?  Yes- No care gap present

## 2023-04-25 ENCOUNTER — OFFICE VISIT (OUTPATIENT)
Facility: CLINIC | Age: 23
End: 2023-04-25
Payer: MEDICAID

## 2023-04-25 VITALS
WEIGHT: 220.2 LBS | TEMPERATURE: 97.5 F | BODY MASS INDEX: 37.59 KG/M2 | SYSTOLIC BLOOD PRESSURE: 114 MMHG | HEIGHT: 64 IN | HEART RATE: 95 BPM | OXYGEN SATURATION: 100 % | DIASTOLIC BLOOD PRESSURE: 79 MMHG | RESPIRATION RATE: 18 BRPM

## 2023-04-25 DIAGNOSIS — F33.1 MODERATE EPISODE OF RECURRENT MAJOR DEPRESSIVE DISORDER (HCC): ICD-10-CM

## 2023-04-25 DIAGNOSIS — N92.6 IRREGULAR MENSTRUAL CYCLE: ICD-10-CM

## 2023-04-25 DIAGNOSIS — J30.1 ALLERGIC RHINITIS DUE TO POLLEN, UNSPECIFIED SEASONALITY: ICD-10-CM

## 2023-04-25 DIAGNOSIS — N39.3 STRESS INCONTINENCE: Primary | ICD-10-CM

## 2023-04-25 DIAGNOSIS — M79.671 BILATERAL FOOT PAIN: ICD-10-CM

## 2023-04-25 DIAGNOSIS — N91.1 AMENORRHEA, SECONDARY: ICD-10-CM

## 2023-04-25 DIAGNOSIS — M79.672 BILATERAL FOOT PAIN: ICD-10-CM

## 2023-04-25 PROCEDURE — 99214 OFFICE O/P EST MOD 30 MIN: CPT | Performed by: STUDENT IN AN ORGANIZED HEALTH CARE EDUCATION/TRAINING PROGRAM

## 2023-04-25 RX ORDER — SERTRALINE HYDROCHLORIDE 100 MG/1
100 TABLET, FILM COATED ORAL DAILY
Qty: 30 TABLET | Refills: 1 | Status: SHIPPED | OUTPATIENT
Start: 2023-04-25

## 2023-04-25 ASSESSMENT — PATIENT HEALTH QUESTIONNAIRE - PHQ9
8. MOVING OR SPEAKING SO SLOWLY THAT OTHER PEOPLE COULD HAVE NOTICED. OR THE OPPOSITE, BEING SO FIGETY OR RESTLESS THAT YOU HAVE BEEN MOVING AROUND A LOT MORE THAN USUAL: 2
6. FEELING BAD ABOUT YOURSELF - OR THAT YOU ARE A FAILURE OR HAVE LET YOURSELF OR YOUR FAMILY DOWN: 2
9. THOUGHTS THAT YOU WOULD BE BETTER OFF DEAD, OR OF HURTING YOURSELF: 0
1. LITTLE INTEREST OR PLEASURE IN DOING THINGS: 2
2. FEELING DOWN, DEPRESSED OR HOPELESS: 2
7. TROUBLE CONCENTRATING ON THINGS, SUCH AS READING THE NEWSPAPER OR WATCHING TELEVISION: 3
SUM OF ALL RESPONSES TO PHQ QUESTIONS 1-9: 18
10. IF YOU CHECKED OFF ANY PROBLEMS, HOW DIFFICULT HAVE THESE PROBLEMS MADE IT FOR YOU TO DO YOUR WORK, TAKE CARE OF THINGS AT HOME, OR GET ALONG WITH OTHER PEOPLE: 2
SUM OF ALL RESPONSES TO PHQ QUESTIONS 1-9: 18
3. TROUBLE FALLING OR STAYING ASLEEP: 3
4. FEELING TIRED OR HAVING LITTLE ENERGY: 2
5. POOR APPETITE OR OVEREATING: 2
SUM OF ALL RESPONSES TO PHQ QUESTIONS 1-9: 18
SUM OF ALL RESPONSES TO PHQ QUESTIONS 1-9: 18
SUM OF ALL RESPONSES TO PHQ9 QUESTIONS 1 & 2: 4

## 2023-04-25 NOTE — PROGRESS NOTES
Chronic Disease Follow up    Evan Fernández (: 2000) is a 26 y.o. female here for evaluation of the following chief concerns(s):  Follow-up (1m follow up )       ASSESSMENT/PLAN:  1. Stress incontinence  -     External Referral To Urology  2. Moderate episode of recurrent major depressive disorder ([facility] [address]  3. Irregular menstrual cycle  4. Amenorrhea, secondary  5. Bilateral foot pain  6. Allergic rhinitis due to pollen, unspecified seasonality    Orders Placed This Encounter   Procedures    External Referral To Urology     Referral Priority:   Routine     Referral Type:   Eval and Treat     Referral Reason:   Specialty Services Required     Requested Specialty:   Urology     Number of Visits Requested:   1     Depression, anxiety - uncontrolled increase Zoloft. Stress incontinence- trial of pelvic floor exercises, referral to urology     Amenorrhea, secondary- likely PCOS. Patient declines trial of OCPs. Thyroid and A1c have been checked, WNL. Consider additional testing with FSH, LD, prolactin, serum testosterone and pelvic ultrasound. Keep up the great work with weight loss, diet/exercise    Allergic rhinitis- Flonase + second generation antihistamine    Bilateral foot pain- plantar fascitis?- trail of NSAIDS, good shoes, Ice. Return in about 4 weeks (around 2023). Patient agrees with plan as above and has no additional questions at this time. SUBJECTIVE/OBJECTIVE:    Patient presents for follow up chronic disease     Depression- mood has worsened. Feeling down more days than not. Less motivation. Zoloft helped at first but it seems effect wore off. She tried the buspar for her anxiety- gave her bad diarrhea. Has been on Lexapro in the past which was ineffective. She has been working on diet/exercise and has maintained her 15lbs weight loss      Allergies bothering her- mostly sinus pressure and sneezing. No fevers/chills.  Not using anything currently     Has not had a period in 6 months

## 2023-04-25 NOTE — PROGRESS NOTES
Khushi Jade presents today for   Chief Complaint   Patient presents with    Follow-up     1m follow up        Is someone accompanying this pt? yes    Is the patient using any DME equipment during OV? no    Health Maintenance reviewed and discussed and ordered per Provider. Health Maintenance Due   Topic Date Due    Pneumococcal 0-64 years Vaccine (1 - PCV) 02/23/2008    HIV screen  Never done    Chlamydia/GC screen  Never done    Varicella vaccine (2 of 2 - 13+ 2-dose series) 08/31/2021   . Coordination of Care:  1. \"Have you been to the ER, urgent care clinic since your last visit? Hospitalized since your last visit? \" No    2. \"Have you seen or consulted any other health care providers outside of the [address] [place] [address] since your last visit? \" No    3. For patients aged 39-70: Has the patient had a colonoscopy? N/A based on age/sex    If the patient is female:    4. For patients aged 41-77: Has the patient had a mammogram within the past 2 years? N/A based on age/sex    5. For patients aged 21-65: Has the patient had a pap smear?  Yes- No care gap present

## 2023-05-03 ENCOUNTER — HOSPITAL ENCOUNTER (EMERGENCY)
Age: 23
Discharge: HOME | End: 2023-05-03
Attending: FAMILY MEDICINE
Payer: MEDICAID

## 2023-05-03 VITALS
HEIGHT: 64 IN | SYSTOLIC BLOOD PRESSURE: 145 MMHG | RESPIRATION RATE: 18 BRPM | TEMPERATURE: 98.6 F | WEIGHT: 215 LBS | DIASTOLIC BLOOD PRESSURE: 81 MMHG | OXYGEN SATURATION: 100 % | BODY MASS INDEX: 36.7 KG/M2 | HEART RATE: 77 BPM

## 2023-05-03 DIAGNOSIS — J30.2 SEASONAL ALLERGIES: ICD-10-CM

## 2023-05-03 DIAGNOSIS — R05.1 ACUTE COUGH: Primary | ICD-10-CM

## 2023-05-03 DIAGNOSIS — H69.81 DYSFUNCTION OF RIGHT EUSTACHIAN TUBE: ICD-10-CM

## 2023-05-03 PROCEDURE — 99282 EMERGENCY DEPT VISIT SF MDM: CPT

## 2023-05-03 RX ORDER — CETIRIZINE HYDROCHLORIDE 10 MG/1
10 TABLET ORAL DAILY
COMMUNITY

## 2023-05-03 RX ORDER — FLUTICASONE PROPIONATE 50 MCG
1 SPRAY, SUSPENSION (ML) NASAL DAILY
COMMUNITY

## 2023-05-03 ASSESSMENT — PAIN DESCRIPTION - LOCATION: LOCATION: EAR

## 2023-05-03 ASSESSMENT — ENCOUNTER SYMPTOMS: COUGH: 1

## 2023-05-03 ASSESSMENT — PAIN SCALES - GENERAL: PAINLEVEL_OUTOF10: 5

## 2023-05-03 ASSESSMENT — PAIN DESCRIPTION - ORIENTATION: ORIENTATION: RIGHT

## 2023-05-03 ASSESSMENT — PAIN - FUNCTIONAL ASSESSMENT: PAIN_FUNCTIONAL_ASSESSMENT: 0-10

## 2023-05-03 NOTE — DISCHARGE INSTRUCTIONS
As we spoke I do believe there is an allergy component to this. Add some decongestion to your regimen such as Zyrtec-D. This will help dry you out. I do not believe you need antibiotics at this time. For cough medicine I recommend Delsym. Avoiding any Mucinex or guaifenesin as this can make her cough worse. I do believe that you have a eustachian tube dysfunction. Which is causing your ear problems. Do the ear pull maneuver as I showed you. Tylenol ibuprofen for any pains remembering that coughing is a form of a tickle which is a form of pain.

## 2023-05-03 NOTE — ED PROVIDER NOTES
[facility] EMERGENCY DEPT  EMERGENCY DEPARTMENT ENCOUNTER      Pt Name: Ben Newman  MRN: [id number]  Paul[place]omo 2000  Date of evaluation: 5/3/2023  Provider: Lynn Clemens, [address] [place] [address]       Chief Complaint   Patient presents with    Cough         HISTORY OF PRESENT ILLNESS   (Location/Symptom, Timing/Onset, Context/Setting, Quality, Duration, Modifying Factors, Severity)  Note limiting factors. Ben Newman is a 26 y.o. female who presents to the emergency department patient comes in stating she been having a cough for 1 week. She takes Zyrtec on a regular basis. States that she has been having a cough and feels some fullness in her right ear. She has been using some DayQuil and NyQuil. HPI    Nursing Notes were reviewed. REVIEW OF SYSTEMS    (2-9 systems for level 4, 10 or more for level 5)     Review of Systems   Constitutional:  Negative for fever. HENT:  Positive for congestion. Respiratory:  Positive for cough. All other systems reviewed and are negative. Except as noted above the remainder of the review of systems was reviewed and negative. PAST MEDICAL HISTORY     Past Medical History:   Diagnosis Date    Chronic tonsillitis 12/19/2018    Depression     Tonsil stone 12/19/2018         SURGICAL HISTORY       Past Surgical History:   Procedure Laterality Date    TONSILLECTOMY           CURRENT MEDICATIONS       Previous Medications    CETIRIZINE (ZYRTEC) 10 MG TABLET    Take 1 tablet by mouth daily    FLUTICASONE (FLONASE) 50 MCG/ACT NASAL SPRAY    1 spray by Each Nostril route daily    HYDROXYZINE HCL PO    Take by mouth    SERTRALINE (ZOLOFT) 100 MG TABLET    Take 1 tablet by mouth daily       ALLERGIES     Patient has no known allergies.     FAMILY HISTORY       Family History   Problem Relation Age of Onset    Diabetes Maternal Grandmother     Diabetes Father           SOCIAL HISTORY       Social History     Socioeconomic History    Marital status: Single     Spouse name:

## 2023-05-03 NOTE — ED TRIAGE NOTES
Pt states she started with clogged ears and cough about a week ago  Pt also c/o sinus pain and nasal congestion  Pt took an at home COVID test that was negative (2 nights ago)  Pt denies fever

## 2023-06-21 ENCOUNTER — OFFICE VISIT (OUTPATIENT)
Facility: CLINIC | Age: 23
End: 2023-06-21
Payer: MEDICAID

## 2023-06-21 VITALS
TEMPERATURE: 98.2 F | RESPIRATION RATE: 18 BRPM | SYSTOLIC BLOOD PRESSURE: 118 MMHG | BODY MASS INDEX: 38.1 KG/M2 | DIASTOLIC BLOOD PRESSURE: 75 MMHG | OXYGEN SATURATION: 99 % | WEIGHT: 223.2 LBS | HEIGHT: 64 IN | HEART RATE: 69 BPM

## 2023-06-21 DIAGNOSIS — F33.1 MODERATE EPISODE OF RECURRENT MAJOR DEPRESSIVE DISORDER (HCC): ICD-10-CM

## 2023-06-21 DIAGNOSIS — B35.4 TINEA CORPORIS: Primary | ICD-10-CM

## 2023-06-21 DIAGNOSIS — F43.21 GRIEF REACTION: ICD-10-CM

## 2023-06-21 DIAGNOSIS — F41.9 ANXIETY: ICD-10-CM

## 2023-06-21 PROCEDURE — 99214 OFFICE O/P EST MOD 30 MIN: CPT | Performed by: STUDENT IN AN ORGANIZED HEALTH CARE EDUCATION/TRAINING PROGRAM

## 2023-06-21 RX ORDER — CLOTRIMAZOLE AND BETAMETHASONE DIPROPIONATE 10; .64 MG/G; MG/G
CREAM TOPICAL
Qty: 15 G | Refills: 1 | Status: SHIPPED | OUTPATIENT
Start: 2023-06-21

## 2023-06-21 NOTE — PROGRESS NOTES
Antonio Carbajal presents today for   Chief Complaint   Patient presents with    Follow-up     2m follow up       Is someone accompanying this pt? no    Is the patient using any DME equipment during OV? no    Health Maintenance reviewed and discussed and ordered per Provider. Health Maintenance Due   Topic Date Due    HIV screen  Never done    Chlamydia/GC screen  Never done    Varicella vaccine (2 of 2 - 13+ 2-dose series) 08/31/2021   . Coordination of Care:  1. \"Have you been to the ER, urgent care clinic since your last visit? Hospitalized since your last visit? \" No    2. \"Have you seen or consulted any other health care providers outside of the [address] [place] [address] since your last visit? \" No    3. For patients aged 39-70: Has the patient had a colonoscopy? N/A based on age/sex    If the patient is female:    4. For patients aged 41-77: Has the patient had a mammogram within the past 2 years? N/A based on age/sex    5. For patients aged 21-65: Has the patient had a pap smear?  Yes- No care gap present

## 2023-06-21 NOTE — PROGRESS NOTES
Chronic disease follow up    Yvette Ledbetter (: 2000) is a 26 y.o. female here for evaluation of the following chief concerns(s):  Follow-up (2m follow up)       ASSESSMENT/PLAN:  1. Tinea corporis  -     clotrimazole-betamethasone (LOTRISONE) 1-0.05 % cream; Apply topically 2 times daily as needed for rash., Disp-15 g, R-1, Normal  2. Moderate episode of recurrent major depressive disorder ([facility] [address]  3. Anxiety  4. Grief reaction    No orders of the defined types were placed in this encounter. Depression/anxiety- stable, continue Zoloft. We had long conversation about normal grief today. .. I have also given her a list of counselors in the area, she really does not have a lot of support and I feel she would benefit from therapy- she agrees. Rash- looks like tinea corporis on the foot- possibly intertrigo in the axillary regions. Treat with antifungal.     FU 1 month    Patient agrees with plan as above and has no additional questions at this time. SUBJECTIVE/OBJECTIVE:    Depression and anxiety- stable, however patient is having a hard time with grief as her grandfather recently passed away- no support from family, no one to talk to . She has been taking her Zoloft compliantly- does feel like it is working    She just recently moved in with her boyfriend in [place]. Going well so far. 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 Has rash on both arms and feet. Not itchy. Not painful. Started a week ago. Vitals:    23 1148   BP: 118/75   Site: Left Upper Arm   Position: Sitting   Cuff Size: Large Adult   Pulse: 69   Resp: 18   Temp: 98.2 °F (36.8 °C)   TempSrc: Temporal   SpO2: 99%   Weight: 223 lb 3.2 oz (101.2 kg)   Height: 5' 4\" (1.626 m)      Body mass index is 38.31 kg/m². Gen: NAD, well appearing   Skin: erythematous circular area with clear center on the foot.  Scattered raised erythematous papules that coalesce

## 2023-07-21 ENCOUNTER — TELEPHONE (OUTPATIENT)
Facility: CLINIC | Age: 23
End: 2023-07-21

## 2024-01-05 ENCOUNTER — HOSPITAL ENCOUNTER (EMERGENCY)
Age: 24
Discharge: HOME | End: 2024-01-05
Attending: FAMILY MEDICINE
Payer: COMMERCIAL

## 2024-01-05 VITALS
BODY MASS INDEX: 30.73 KG/M2 | SYSTOLIC BLOOD PRESSURE: 149 MMHG | RESPIRATION RATE: 18 BRPM | WEIGHT: 180 LBS | HEART RATE: 70 BPM | OXYGEN SATURATION: 100 % | DIASTOLIC BLOOD PRESSURE: 96 MMHG | HEIGHT: 64 IN | TEMPERATURE: 98.2 F

## 2024-01-05 DIAGNOSIS — J06.9 VIRAL URI WITH COUGH: Primary | ICD-10-CM

## 2024-01-05 PROCEDURE — 99282 EMERGENCY DEPT VISIT SF MDM: CPT

## 2024-01-05 ASSESSMENT — ENCOUNTER SYMPTOMS: COUGH: 1

## 2024-01-05 ASSESSMENT — LIFESTYLE VARIABLES
HOW OFTEN DO YOU HAVE A DRINK CONTAINING ALCOHOL: NEVER
HOW MANY STANDARD DRINKS CONTAINING ALCOHOL DO YOU HAVE ON A TYPICAL DAY: PATIENT DOES NOT DRINK

## 2024-01-05 ASSESSMENT — PAIN DESCRIPTION - LOCATION: LOCATION: HEAD

## 2024-01-05 ASSESSMENT — PAIN - FUNCTIONAL ASSESSMENT: PAIN_FUNCTIONAL_ASSESSMENT: 0-10

## 2024-01-05 ASSESSMENT — PAIN SCALES - GENERAL: PAINLEVEL_OUTOF10: 7

## 2024-01-05 NOTE — ED PROVIDER NOTES
[facility] EMERGENCY DEPT  EMERGENCY DEPARTMENT ENCOUNTER      Pt Name: Marcelle Henao  MRN: [id number]  Birthdate 2000  Date of evaluation: 1/5/2024  Provider: Rick Sheirdan DO  9:38 AM    CHIEF COMPLAINT       Chief Complaint   Patient presents with    Cough         HISTORY OF PRESENT ILLNESS    Marcelle Henao is a 23 y.o. female who presents to the emergency department patient had a cough for about 5 days.  States her coughing is worse when she wakes up and starts moving around.  Denies any fevers.  Everybody at work has a she believes that some flu some COVID.  She has been taking Mucinex which she feels has made her cough worse.    HPI    Nursing Notes were reviewed.    REVIEW OF SYSTEMS       Review of Systems   Constitutional:  Negative for fever.   HENT:  Positive for congestion.    Respiratory:  Positive for cough.    All other systems reviewed and are negative.      Except as noted above the remainder of the review of systems was reviewed and negative.       PAST MEDICAL HISTORY     Past Medical History:   Diagnosis Date    Chronic tonsillitis 12/19/2018    Depression     Tonsil stone 12/19/2018         SURGICAL HISTORY       Past Surgical History:   Procedure Laterality Date    TONSILLECTOMY           CURRENT MEDICATIONS       Previous Medications    CETIRIZINE (ZYRTEC) 10 MG TABLET    Take 1 tablet by mouth daily    CLOTRIMAZOLE-BETAMETHASONE (LOTRISONE) 1-0.05 % CREAM    Apply topically 2 times daily as needed for rash.    FLUTICASONE (FLONASE) 50 MCG/ACT NASAL SPRAY    1 spray by Each Nostril route daily    HYDROXYZINE HCL PO    Take by mouth    SERTRALINE (ZOLOFT) 100 MG TABLET    Take 1 tablet by mouth daily       ALLERGIES     Patient has no known allergies.    FAMILY HISTORY       Family History   Problem Relation Age of Onset    Diabetes Maternal Grandmother     Diabetes Father           SOCIAL HISTORY       Social History     Socioeconomic History    Marital status: Single     Spouse name: None     this is the Mucinex issue have the patient stopped this.  Given return precautions.            REASSESSMENT          CRITICAL CARE TIME     CONSULTS:  None    PROCEDURES:  Unless otherwise noted below, none     Procedures        FINAL IMPRESSION      1. Viral URI with cough          DISPOSITION/PLAN   DISPOSITION Decision To Discharge 01/05/2024 09:36:00 AM      PATIENT REFERRED TO:  Jazmin Sadler MD  [address] [place], [place] [id number] [phone]    Schedule an appointment as soon as possible for a visit in 3 days        DISCHARGE MEDICATIONS:  New Prescriptions    No medications on file     Controlled Substances Monitoring:          No data to display                (Please note that portions of this note were completed with a voice recognition program.  Efforts were made to edit the dictations but occasionally words are mis-transcribed.)    Rick Sheridan DO (electronically signed)  Attending Emergency Physician           Rick Sheridan DO  01/05/24 [id number]

## 2024-01-05 NOTE — DISCHARGE INSTRUCTIONS
As we spoke this is probably viral.  My recommendation would be to stop the Mucinex this is probably making her cough worse.  Use Tylenol ibuprofen for any pains remembering that coughing is a form of pain, use over-the-counter Delsym which has no guaifenesin or Mucinex in it.  I believe that the Mucinex can cause your cough to be worse follow-up with your primary care doctor if not improving.  Return to the emergency department if you have any difficulties with breathing.

## 2024-01-05 NOTE — ED TRIAGE NOTES
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

## 2024-07-22 ENCOUNTER — TELEPHONE (OUTPATIENT)
Facility: CLINIC | Age: 24
End: 2024-07-22

## 2024-08-01 ENCOUNTER — OFFICE VISIT (OUTPATIENT)
Facility: CLINIC | Age: 24
End: 2024-08-01

## 2024-08-01 VITALS
HEIGHT: 64 IN | WEIGHT: 160.2 LBS | HEART RATE: 69 BPM | OXYGEN SATURATION: 100 % | BODY MASS INDEX: 27.35 KG/M2 | SYSTOLIC BLOOD PRESSURE: 107 MMHG | TEMPERATURE: 97.5 F | DIASTOLIC BLOOD PRESSURE: 74 MMHG | RESPIRATION RATE: 18 BRPM

## 2024-08-01 DIAGNOSIS — R63.4 WEIGHT LOSS: Primary | ICD-10-CM

## 2024-08-01 DIAGNOSIS — F33.1 MODERATE EPISODE OF RECURRENT MAJOR DEPRESSIVE DISORDER (HCC): ICD-10-CM

## 2024-08-01 LAB — HBA1C MFR BLD: 5.3 %

## 2024-08-01 PROCEDURE — 83036 HEMOGLOBIN GLYCOSYLATED A1C: CPT | Performed by: STUDENT IN AN ORGANIZED HEALTH CARE EDUCATION/TRAINING PROGRAM

## 2024-08-01 PROCEDURE — 99215 OFFICE O/P EST HI 40 MIN: CPT | Performed by: STUDENT IN AN ORGANIZED HEALTH CARE EDUCATION/TRAINING PROGRAM

## 2024-08-01 RX ORDER — BUPROPION HYDROCHLORIDE 150 MG/1
150 TABLET ORAL EVERY MORNING
Qty: 30 TABLET | Refills: 1 | Status: SHIPPED | OUTPATIENT
Start: 2024-08-01

## 2024-08-01 SDOH — ECONOMIC STABILITY: HOUSING INSECURITY
IN THE LAST 12 MONTHS, WAS THERE A TIME WHEN YOU DID NOT HAVE A STEADY PLACE TO SLEEP OR SLEPT IN A SHELTER (INCLUDING NOW)?: NO

## 2024-08-01 SDOH — ECONOMIC STABILITY: FOOD INSECURITY: WITHIN THE PAST 12 MONTHS, YOU WORRIED THAT YOUR FOOD WOULD RUN OUT BEFORE YOU GOT MONEY TO BUY MORE.: SOMETIMES TRUE

## 2024-08-01 SDOH — ECONOMIC STABILITY: FOOD INSECURITY: WITHIN THE PAST 12 MONTHS, THE FOOD YOU BOUGHT JUST DIDN'T LAST AND YOU DIDN'T HAVE MONEY TO GET MORE.: NEVER TRUE

## 2024-08-01 SDOH — ECONOMIC STABILITY: INCOME INSECURITY: HOW HARD IS IT FOR YOU TO PAY FOR THE VERY BASICS LIKE FOOD, HOUSING, MEDICAL CARE, AND HEATING?: SOMEWHAT HARD

## 2024-08-01 ASSESSMENT — PATIENT HEALTH QUESTIONNAIRE - PHQ9
10. IF YOU CHECKED OFF ANY PROBLEMS, HOW DIFFICULT HAVE THESE PROBLEMS MADE IT FOR YOU TO DO YOUR WORK, TAKE CARE OF THINGS AT HOME, OR GET ALONG WITH OTHER PEOPLE: EXTREMELY DIFFICULT
4. FEELING TIRED OR HAVING LITTLE ENERGY: MORE THAN HALF THE DAYS
3. TROUBLE FALLING OR STAYING ASLEEP: SEVERAL DAYS
SUM OF ALL RESPONSES TO PHQ9 QUESTIONS 1 & 2: 3
SUM OF ALL RESPONSES TO PHQ QUESTIONS 1-9: 18
SUM OF ALL RESPONSES TO PHQ QUESTIONS 1-9: 18
5. POOR APPETITE OR OVEREATING: NEARLY EVERY DAY
2. FEELING DOWN, DEPRESSED OR HOPELESS: SEVERAL DAYS
SUM OF ALL RESPONSES TO PHQ QUESTIONS 1-9: 18
1. LITTLE INTEREST OR PLEASURE IN DOING THINGS: MORE THAN HALF THE DAYS
SUM OF ALL RESPONSES TO PHQ QUESTIONS 1-9: 17
6. FEELING BAD ABOUT YOURSELF - OR THAT YOU ARE A FAILURE OR HAVE LET YOURSELF OR YOUR FAMILY DOWN: NEARLY EVERY DAY
9. THOUGHTS THAT YOU WOULD BE BETTER OFF DEAD, OR OF HURTING YOURSELF: SEVERAL DAYS
8. MOVING OR SPEAKING SO SLOWLY THAT OTHER PEOPLE COULD HAVE NOTICED. OR THE OPPOSITE, BEING SO FIGETY OR RESTLESS THAT YOU HAVE BEEN MOVING AROUND A LOT MORE THAN USUAL: MORE THAN HALF THE DAYS
7. TROUBLE CONCENTRATING ON THINGS, SUCH AS READING THE NEWSPAPER OR WATCHING TELEVISION: NEARLY EVERY DAY

## 2024-08-01 ASSESSMENT — COLUMBIA-SUICIDE SEVERITY RATING SCALE - C-SSRS
2. HAVE YOU ACTUALLY HAD ANY THOUGHTS OF KILLING YOURSELF?: NO
1. WITHIN THE PAST MONTH, HAVE YOU WISHED YOU WERE DEAD OR WISHED YOU COULD GO TO SLEEP AND NOT WAKE UP?: NO
6. HAVE YOU EVER DONE ANYTHING, STARTED TO DO ANYTHING, OR PREPARED TO DO ANYTHING TO END YOUR LIFE?: NO

## 2024-08-01 NOTE — PROGRESS NOTES
SICK VISIT     Marcelle Henao (: 2000) is a 24 y.o. female here for evaluation of the following chief concerns(s):  Annual Exam       ASSESSMENT/PLAN:  1. Weight loss  -     CBC with Auto Differential; Future  -     Comprehensive Metabolic Panel; Future  -     Thyroid Cascade Profile; Future  -     Urinalysis with Microscopic; Future  -     Hepatitis C Antibody; Future  -     HIV 1/2 Ag/Ab, 4TH Generation,W Rflx Confirm; Future  -     Iron; Future  -     Iron and TIBC; Future  -     AMB POC HEMOGLOBIN A1C  2. Moderate episode of recurrent major depressive disorder (HCC)  -     buPROPion (WELLBUTRIN XL) 150 MG extended release tablet; Take 1 tablet by mouth every morning, Disp-30 tablet, R-1Normal    Orders Placed This Encounter   Procedures    CBC with Auto Differential     Standing Status:   Future     Standing Expiration Date:   2025    Comprehensive Metabolic Panel     Standing Status:   Future     Standing Expiration Date:   2025    Thyroid Cascade Profile     Standing Status:   Future     Standing Expiration Date:   2025    Urinalysis with Microscopic     Standing Status:   Future     Standing Expiration Date:   2025    Hepatitis C Antibody     Standing Status:   Future     Standing Expiration Date:   2025    HIV 1/2 Ag/Ab, 4TH Generation,W Rflx Confirm     Standing Status:   Future     Standing Expiration Date:   2025    Iron     Standing Status:   Future     Standing Expiration Date:   2025    Iron and TIBC     Standing Status:   Future     Standing Expiration Date:   2025    AMB POC HEMOGLOBIN A1C     Depression is uncontrolled-Lexapro worked well in the past, however patient does not want to restart because she does not want to gain weight.  No history of eating disorders or seizures, will try Wellbutrin- discussed side effect profile with patient.  There is a lot going on with her past traumas-I would likely recommend getting in with a regular therapist and also

## 2024-08-01 NOTE — PROGRESS NOTES
Fingerstick for HBa1C done in first finger by Shanthi Sharma MA per order of  Jazmin Sadler MD  after cleaning area with alcohol wipe.  Patient tolerated procedure well.

## 2024-08-01 NOTE — PROGRESS NOTES
Marcelle Henao presents today for   Chief Complaint   Patient presents with    Annual Exam       Is someone accompanying this pt? no    Is the patient using any DME equipment during OV? no    Health Maintenance Due   Topic Date Due    Hepatitis B vaccine (3 of 3 - 3-dose series) 03/05/2003    HIV screen  Never done    Chlamydia/GC screen  Never done    Varicella vaccine (2 of 2 - 13+ 2-dose series) 08/31/2021    Pap smear  02/04/2024    Depression Monitoring  04/25/2024    Flu vaccine (1) Never done       \"Have you been to the ER, urgent care clinic since your last visit?  Hospitalized since your last visit?\"    NO    “Have you seen or consulted any other health care providers outside of [facility] since your last visit?”    NO        “Have you had a pap smear?”    YES - Where: 2/4/2021 Nurse/CMA to request most recent records if not in the chart    Date of last Cervical Cancer screen (HPV or PAP): 2/4/2021

## 2025-01-08 ENCOUNTER — OFFICE VISIT (OUTPATIENT)
Facility: CLINIC | Age: 25
End: 2025-01-08
Payer: MEDICAID

## 2025-01-08 VITALS
OXYGEN SATURATION: 91 % | HEIGHT: 64 IN | RESPIRATION RATE: 18 BRPM | BODY MASS INDEX: 24.55 KG/M2 | SYSTOLIC BLOOD PRESSURE: 91 MMHG | WEIGHT: 143.8 LBS | TEMPERATURE: 97 F | HEART RATE: 77 BPM | DIASTOLIC BLOOD PRESSURE: 61 MMHG

## 2025-01-08 DIAGNOSIS — R63.4 UNINTENTIONAL WEIGHT LOSS: Primary | ICD-10-CM

## 2025-01-08 DIAGNOSIS — R63.4 UNINTENTIONAL WEIGHT LOSS: ICD-10-CM

## 2025-01-08 DIAGNOSIS — R19.7 DIARRHEA, UNSPECIFIED TYPE: ICD-10-CM

## 2025-01-08 DIAGNOSIS — K21.9 GASTROESOPHAGEAL REFLUX DISEASE, UNSPECIFIED WHETHER ESOPHAGITIS PRESENT: ICD-10-CM

## 2025-01-08 PROCEDURE — 99214 OFFICE O/P EST MOD 30 MIN: CPT | Performed by: STUDENT IN AN ORGANIZED HEALTH CARE EDUCATION/TRAINING PROGRAM

## 2025-01-08 RX ORDER — PNV NO.95/FERROUS FUM/FOLIC AC 28MG-0.8MG
1 TABLET ORAL DAILY
Qty: 90 TABLET | Refills: 1 | Status: SHIPPED | OUTPATIENT
Start: 2025-01-08

## 2025-01-08 SDOH — ECONOMIC STABILITY: FOOD INSECURITY: WITHIN THE PAST 12 MONTHS, THE FOOD YOU BOUGHT JUST DIDN'T LAST AND YOU DIDN'T HAVE MONEY TO GET MORE.: PATIENT DECLINED

## 2025-01-08 SDOH — ECONOMIC STABILITY: FOOD INSECURITY: WITHIN THE PAST 12 MONTHS, YOU WORRIED THAT YOUR FOOD WOULD RUN OUT BEFORE YOU GOT MONEY TO BUY MORE.: PATIENT DECLINED

## 2025-01-08 ASSESSMENT — PATIENT HEALTH QUESTIONNAIRE - PHQ9
4. FEELING TIRED OR HAVING LITTLE ENERGY: MORE THAN HALF THE DAYS
1. LITTLE INTEREST OR PLEASURE IN DOING THINGS: SEVERAL DAYS
7. TROUBLE CONCENTRATING ON THINGS, SUCH AS READING THE NEWSPAPER OR WATCHING TELEVISION: SEVERAL DAYS
9. THOUGHTS THAT YOU WOULD BE BETTER OFF DEAD, OR OF HURTING YOURSELF: NOT AT ALL
2. FEELING DOWN, DEPRESSED OR HOPELESS: SEVERAL DAYS
SUM OF ALL RESPONSES TO PHQ QUESTIONS 1-9: 13
5. POOR APPETITE OR OVEREATING: SEVERAL DAYS
SUM OF ALL RESPONSES TO PHQ QUESTIONS 1-9: 13
SUM OF ALL RESPONSES TO PHQ QUESTIONS 1-9: 13
6. FEELING BAD ABOUT YOURSELF - OR THAT YOU ARE A FAILURE OR HAVE LET YOURSELF OR YOUR FAMILY DOWN: MORE THAN HALF THE DAYS
SUM OF ALL RESPONSES TO PHQ QUESTIONS 1-9: 13
SUM OF ALL RESPONSES TO PHQ9 QUESTIONS 1 & 2: 2
10. IF YOU CHECKED OFF ANY PROBLEMS, HOW DIFFICULT HAVE THESE PROBLEMS MADE IT FOR YOU TO DO YOUR WORK, TAKE CARE OF THINGS AT HOME, OR GET ALONG WITH OTHER PEOPLE: SOMEWHAT DIFFICULT
3. TROUBLE FALLING OR STAYING ASLEEP: NEARLY EVERY DAY
8. MOVING OR SPEAKING SO SLOWLY THAT OTHER PEOPLE COULD HAVE NOTICED. OR THE OPPOSITE, BEING SO FIGETY OR RESTLESS THAT YOU HAVE BEEN MOVING AROUND A LOT MORE THAN USUAL: MORE THAN HALF THE DAYS

## 2025-01-08 NOTE — PROGRESS NOTES
Follow up    Marcelle Henao (: 2000) is a 24 y.o. female here for evaluation of the following chief concerns(s):  Follow-up       ASSESSMENT/PLAN:  1. Unintentional weight loss  -     CBC with Auto Differential; Future  -     Comprehensive Metabolic Panel; Future  -     Thyroid Cascade Profile; Future  -     Occult Blood Stool Immunoassay; Future  -     Urinalysis with Microscopic; Future  -     Sedimentation Rate; Future  -     Hepatitis C Antibody; Future  -     XR CHEST (2 VW); Future  -     HIV 1/2 Ag/Ab, 4TH Generation,W Rflx Confirm; Future  -     Iron and TIBC; Future  -     Iron and TIBC; Future  -     Celiac Disease Panel; Future  -     Hemoglobin A1C; Future  -     [facility]io Griffith MD[facility] ([place] )  2. Gastroesophageal reflux disease, unspecified whether esophagitis present  3. Diarrhea, unspecified type  -     [facility] Davison MD[facility] ([place] )    Orders Placed This Encounter   Procedures    Occult Blood Stool Immunoassay     Standing Status:   Future     Standing Expiration Date:   2026    XR CHEST (2 VW)     Standing Status:   Future     Standing Expiration Date:   2026     Order Specific Question:   Reason for exam:     Answer:   Unintentional weight loss    CBC with Auto Differential     Standing Status:   Future     Standing Expiration Date:   2026    Comprehensive Metabolic Panel     Standing Status:   Future     Standing Expiration Date:   2026    Thyroid Cascade Profile     Standing Status:   Future     Standing Expiration Date:   2026    Urinalysis with Microscopic     Standing Status:   Future     Standing Expiration Date:   2026    Sedimentation Rate     Standing Status:   Future     Standing Expiration Date:   2026    Hepatitis C Antibody     Standing Status:   Future     Standing Expiration Date:   2026    HIV 1/2 Ag/Ab, 4TH Generation,W Rflx Confirm     Standing Status:   Future

## 2025-01-08 NOTE — PROGRESS NOTES
Marcelle Henao presents today for   Chief Complaint   Patient presents with    Follow-up       Is someone accompanying this pt? no    Is the patient using any DME equipment during OV? no    Health Maintenance Due   Topic Date Due    Hepatitis B vaccine (3 of 3 - 3-dose series) 03/05/2003    HIV screen  Never done    Chlamydia/GC screen  Never done    Varicella vaccine (2 of 2 - 13+ 2-dose series) 08/31/2021    Flu vaccine (1) Never done    COVID-19 Vaccine (1 - 2023-24 season) Never done         \"Have you been to the ER, urgent care clinic since your last visit?  Hospitalized since your last visit?\"    NO    “Have you seen or consulted any other health care providers outside our system since your last visit?”    NO Patient just recently start seeing a therapist once a week..

## 2025-01-09 ENCOUNTER — TELEPHONE (OUTPATIENT)
Age: 25
End: 2025-01-09

## 2025-01-09 NOTE — TELEPHONE ENCOUNTER
Referral for Unintentional weight loss and diarrhea. Please review and advise.   Referral  Referral # [id number]  Referral Information    Referral # Creation Date Referral Status Status Update    [id number] 01/08/2025 Open 01/08/2025: Status History     Status Reason Referral Type Referral Reasons Referral Class   none Eval and Treat Specialty Services Required Internal     To Specialty To Provider To Location/Place of Service To Department   Gastroenterology Vinicio Griffith MD none [facility] - GASTROENTEROLOGY     To Vendor Referred By By Location/Place of Service By Department   none Jazmin Sadler MD [facility]     Priority Start Date Expiration Date Referral Entered By   Routine 01/08/2025 01/08/2026 Jazmin Sadler MD     Visits Requested Visits Authorized Visits Completed Visits Scheduled   1 1       Coverages    Payer Plan Auth. Required? Covered? Member # Authorized From Expires Auth # Precert. # Comment   [place]ICA[place] [facility] -- Covered [id number] 1/1/2025 -- -- -- --     Referral Information    Referral # Creation Date Referral Status Status Update    [id number] 01/08/2025 Open 01/08/2025: Status History     Status Reason Referral Type Referral Reasons Referral Class   none Eval and Treat Specialty Services Required Internal     To Specialty To Provider To Location/Place of Service To Department   Gastroenterology Vinicio Griffith MD none [facility] - GASTROENTEROLOGY     To Vendor Referred By By Location/Place of Service By Department   none Jazmin Sadler MD [facility]     Priority Start Date Expiration Date Referral Entered By   Routine 01/08/2025 01/08/2026 Jazmin Sadler MD     Visits Requested Visits Authorized Visits Completed Visits Scheduled   1 1       Procedure Information    Service

## 2025-01-16 ENCOUNTER — SCHEDULED TELEPHONE ENCOUNTER (OUTPATIENT)
Age: 25
End: 2025-01-16

## 2025-01-16 DIAGNOSIS — R63.4 UNINTENTIONAL WEIGHT LOSS: ICD-10-CM

## 2025-01-17 PROBLEM — R19.7 DIARRHEA, UNSPECIFIED: Status: ACTIVE | Noted: 2025-01-16

## 2025-01-17 PROBLEM — R63.4 UNINTENTIONAL WEIGHT LOSS: Status: ACTIVE | Noted: 2025-01-16

## 2025-01-17 NOTE — PROGRESS NOTES
Patient scheduled for a colonoscopy on 2/12/2025  Instructions emailed, ,mailed and sent through [employer] sent  Surgical Registration Form scanned.  Case Request Opened     AMS

## 2025-02-04 ENCOUNTER — OFFICE VISIT (OUTPATIENT)
Facility: CLINIC | Age: 25
End: 2025-02-04
Payer: MEDICARE

## 2025-02-04 VITALS
BODY MASS INDEX: 24.24 KG/M2 | SYSTOLIC BLOOD PRESSURE: 112 MMHG | RESPIRATION RATE: 18 BRPM | TEMPERATURE: 95.9 F | WEIGHT: 142 LBS | DIASTOLIC BLOOD PRESSURE: 70 MMHG | HEART RATE: 58 BPM | HEIGHT: 64 IN | OXYGEN SATURATION: 93 %

## 2025-02-04 DIAGNOSIS — R63.4 UNINTENTIONAL WEIGHT LOSS: ICD-10-CM

## 2025-02-04 DIAGNOSIS — R07.1 CHEST PAIN ON BREATHING: Primary | ICD-10-CM

## 2025-02-04 DIAGNOSIS — F17.200 VAPING NICOTINE DEPENDENCE, NON-TOBACCO PRODUCT: ICD-10-CM

## 2025-02-04 PROCEDURE — 93000 ELECTROCARDIOGRAM COMPLETE: CPT | Performed by: STUDENT IN AN ORGANIZED HEALTH CARE EDUCATION/TRAINING PROGRAM

## 2025-02-04 PROCEDURE — 99214 OFFICE O/P EST MOD 30 MIN: CPT | Performed by: STUDENT IN AN ORGANIZED HEALTH CARE EDUCATION/TRAINING PROGRAM

## 2025-02-04 NOTE — PROGRESS NOTES
Follow up    Marcelle Henao (: 2000) is a 24 y.o. female here for evaluation of the following chief concerns(s):  3 - 4 week follow up (Patient has chest pain when she inhales.)       ASSESSMENT/PLAN:  1. Chest pain on breathing  -     EKG 12 lead; Future  2. Unintentional weight loss  3. Vaping nicotine dependence, non-tobacco product    Orders Placed This Encounter   Procedures    EKG 12 lead     Standing Status:   Future     Standing Expiration Date:   2025     Order Specific Question:   Reason for Exam?     Answer:   Chest pain     Unintentional weight loss-stressed importance of getting labs and chest x-ray done so that I can have some idea of what is going on.  I am happy that she has her EGD and colonoscopy scheduled next week.    Chest pain with deep inspiration-EKG in the office today is normal sinus rhythm, no ischemic changes.  She had an EGD next week to rule out acid reflux, no symptoms however.  Chest x-ray pending.  Vaping cessation counseling today in the office-I strongly encourage this.  Could consider PFTs to rule out asthma, especially if cough is ongoing-but seems likely postviral cough since she was sick with a virus 1 month ago.    No follow-ups on file.    Patient agrees with plan as above and has no additional questions at this time.     SUBJECTIVE/OBJECTIVE:    Patient presents for follow up.     Her weight is stable today.  However she has lost 80 pounds over the past 2 years unintentionally.  She is still not been able to get her blood work done.  She has some transportation issues.  There is also a chest x-ray pending.  She has an EGD and colonoscopy scheduled next week for unintentional weight loss workup.    She complains today about chest squeezing when she inhales deeply.  This only happens when she is on her period.  Has been happening for a couple of months.  She had a bad virus about a month ago and is still having some dry cough from this.  She is still vaping.  No chest

## 2025-02-04 NOTE — PROGRESS NOTES
Marcelle Henao presents today for   Chief Complaint   Patient presents with    3 - 4 week follow up     Patient has chest pain when she inhales.       Is someone accompanying this pt? no    Is the patient using any DME equipment during OV? no    Health Maintenance Due   Topic Date Due    Hepatitis B vaccine (3 of 3 - 3-dose series) 03/05/2003    HIV screen  Never done    Chlamydia/GC screen  Never done    Varicella vaccine (2 of 2 - 13+ 2-dose series) 08/31/2021    Flu vaccine (1) Never done    COVID-19 Vaccine (1 - 2023-24 season) Never done         \"Have you been to the ER, urgent care clinic since your last visit?  Hospitalized since your last visit?\"    NO    “Have you seen or consulted any other health care providers outside our system since your last visit?”    NO

## 2025-02-08 ENCOUNTER — PREP FOR PROCEDURE (OUTPATIENT)
Age: 25
End: 2025-02-08

## 2025-02-08 DIAGNOSIS — R19.7 DIARRHEA, UNSPECIFIED TYPE: ICD-10-CM

## 2025-02-08 DIAGNOSIS — R63.4 UNINTENTIONAL WEIGHT LOSS: Primary | ICD-10-CM

## 2025-02-08 RX ORDER — SODIUM CHLORIDE, SODIUM LACTATE, POTASSIUM CHLORIDE, CALCIUM CHLORIDE 600; 310; 30; 20 MG/100ML; MG/100ML; MG/100ML; MG/100ML
INJECTION, SOLUTION INTRAVENOUS CONTINUOUS
Status: CANCELLED | OUTPATIENT
Start: 2025-02-08

## 2025-02-12 ENCOUNTER — TELEPHONE (OUTPATIENT)
Age: 25
End: 2025-02-12